# Patient Record
Sex: MALE | Race: WHITE | NOT HISPANIC OR LATINO | Employment: FULL TIME | URBAN - METROPOLITAN AREA
[De-identification: names, ages, dates, MRNs, and addresses within clinical notes are randomized per-mention and may not be internally consistent; named-entity substitution may affect disease eponyms.]

---

## 2017-01-27 ENCOUNTER — OFFICE VISIT (OUTPATIENT)
Dept: GASTROENTEROLOGY | Facility: CLINIC | Age: 32
End: 2017-01-27

## 2017-01-27 VITALS
WEIGHT: 267 LBS | TEMPERATURE: 98.2 F | BODY MASS INDEX: 37.38 KG/M2 | DIASTOLIC BLOOD PRESSURE: 80 MMHG | SYSTOLIC BLOOD PRESSURE: 118 MMHG | OXYGEN SATURATION: 96 % | HEART RATE: 72 BPM | HEIGHT: 71 IN

## 2017-01-27 DIAGNOSIS — R12 HEARTBURN: ICD-10-CM

## 2017-01-27 DIAGNOSIS — K21.9 GASTROESOPHAGEAL REFLUX DISEASE, ESOPHAGITIS PRESENCE NOT SPECIFIED: Primary | ICD-10-CM

## 2017-01-27 DIAGNOSIS — R10.13 DYSPEPSIA: ICD-10-CM

## 2017-01-27 PROCEDURE — 99204 OFFICE O/P NEW MOD 45 MIN: CPT | Performed by: INTERNAL MEDICINE

## 2017-01-27 NOTE — PROGRESS NOTES
Chief Complaint   Patient presents with   • Heartburn       History of Present Illness:   HPI  Mr. Mcclelland is a 32 yo with a history of hypertension and hyperlipidemia.  The patient states that around the end of 2015 he was experiencing problems with burning in the throat and ann marie heartburn.  He was started on Prilosec and he continued the medication until March 2016.  He stopped the medication and was doing well until September of last year when he began experiencing again some burning in the throat.  The patient denies any difficult or painful swallowing.  He has tried some Nexium over-the-counter that he generally would take once in the morning but he would occasionally need to take Zantac at night.  The patient also has used some Gaviscon and other antiacids.  Mr. Mcclelland says there was an issue with the insurance to get other medication for reflux. There is no history of vomiting although occasionally he may have some nausea after meal.  The patient admits to some sense of regurgitation at times.  There is no history of unexplained weight loss.  The patient denies any signs of gastrointestinal bleeding.  He has regular bowel habits without any change in stool caliber.  There is no history of night sweats or fever.  Mr. Mcclelland had a HIDA scan that was abnormal although he states that he was told a vasovagal episode occurred during the study.  He does not drink alcohol on a regular basis.  The patient does not smoke cigarettes.  No history of gastrointestinal cancer in first-degree relatives.  Past Medical History   Diagnosis Date   • Hyperlipidemia    • Hypertension        Past Surgical History   Procedure Laterality Date   • No past surgeries           Current Outpatient Prescriptions:   •  atorvastatin (LIPITOR) 10 MG tablet, TAKE 1 TABLET BY MOUTH AT BEDTIME , Disp: 30 tablet, Rfl: 2  •  Blood Pressure kit, , Disp: , Rfl:   •  esomeprazole (NexIUM) 40 MG packet, Take 40 mg by mouth 2 (Two) Times a  Day. (Patient taking differently: Take 22.5 mg by mouth Daily.), Disp: 60 packet, Rfl: 2  •  lisinopril-hydrochlorothiazide (PRINZIDE,ZESTORETIC) 10-12.5 MG per tablet, TAKE 1 TABLET BY MOUTH ONE TIME A DAY , Disp: 30 tablet, Rfl: 4    No Known Allergies    Family History   Problem Relation Age of Onset   • COPD Mother    • Heart attack Mother 65   • Hypertension Mother    • Hyperlipidemia Mother    • Heart disease Mother    • Hiatal hernia Mother    • Hyperlipidemia Father    • Hypertension Father    • Transient ischemic attack Father    • COPD Father    • Hiatal hernia Sister    • No Known Problems Brother    • No Known Problems Maternal Grandmother    • Cancer Maternal Grandfather      prostate   • Leukemia Paternal Grandmother    • Heart disease Paternal Grandfather    • Hyperlipidemia Paternal Grandfather    • Hypertension Paternal Grandfather    • Heart attack Paternal Grandfather    • No Known Problems Sister        Social History     Social History   • Marital status:      Spouse name: N/A   • Number of children: N/A   • Years of education: N/A     Occupational History   • Not on file.     Social History Main Topics   • Smoking status: Never Smoker   • Smokeless tobacco: Never Used   • Alcohol use 0.6 oz/week     1 Glasses of wine, 1 Cans of beer per week   • Drug use: No   • Sexual activity: Yes     Partners: Female     Other Topics Concern   • Not on file     Social History Narrative    ** Merged History Encounter **            Review of Systems   Constitutional: Negative for activity change, appetite change, fatigue, fever and unexpected weight change.   HENT: Positive for sore throat (burning). Negative for dental problem, hearing loss, mouth sores, postnasal drip, sneezing, trouble swallowing and voice change.    Eyes: Negative for pain, redness, itching and visual disturbance.   Respiratory: Positive for cough. Negative for choking, chest tightness, shortness of breath and wheezing.     Cardiovascular: Negative for chest pain, palpitations and leg swelling.   Gastrointestinal: Positive for abdominal pain (discomfort) and nausea. Negative for abdominal distention, anal bleeding, blood in stool, constipation, diarrhea, rectal pain and vomiting.   Endocrine: Negative for cold intolerance, heat intolerance, polydipsia, polyphagia and polyuria.   Genitourinary: Negative.  Negative for dysuria, enuresis, flank pain, hematuria and urgency.   Musculoskeletal: Negative for arthralgias, back pain, gait problem, joint swelling and myalgias.   Skin: Negative for color change, pallor and rash.   Allergic/Immunologic: Negative for environmental allergies, food allergies and immunocompromised state.   Neurological: Negative for dizziness, tremors, seizures, facial asymmetry, speech difficulty, numbness and headaches.   Hematological: Negative for adenopathy.   Psychiatric/Behavioral: Negative for behavioral problems, confusion, dysphoric mood, hallucinations and self-injury.       Vitals:    01/27/17 1207   BP: 118/80   Pulse: 72   Temp: 98.2 °F (36.8 °C)   SpO2: 96%       Physical Exam   Constitutional: He is oriented to person, place, and time. He appears well-developed and well-nourished. No distress.   HENT:   Head: Normocephalic and atraumatic.   Mouth/Throat: Oropharynx is clear and moist. No oropharyngeal exudate.   Eyes: Conjunctivae and EOM are normal. Pupils are equal, round, and reactive to light. No scleral icterus.   Neck: Normal range of motion. Neck supple. No thyromegaly present.   Cardiovascular: Normal rate, regular rhythm and normal heart sounds.  Exam reveals no gallop.    No murmur heard.  Pulmonary/Chest: Effort normal and breath sounds normal. He has no wheezes. He has no rales.   Abdominal: Soft. Bowel sounds are normal. He exhibits no distension. There is no tenderness. There is no rebound and no guarding.   Musculoskeletal: Normal range of motion. He exhibits no edema or tenderness.    Lymphadenopathy:     He has no cervical adenopathy.   Neurological: He is alert and oriented to person, place, and time. He exhibits normal muscle tone.   Skin: Skin is dry. No erythema. No pallor.   Psychiatric: He has a normal mood and affect. His behavior is normal. Thought content normal.   Vitals reviewed.      Jayden was seen today for heartburn.    Diagnoses and all orders for this visit:    Gastroesophageal reflux disease, esophagitis presence not specified  -     [facility] Surgical/Procedural Request    Heartburn  -     [facility] Surgical/Procedural Request    Dyspepsia  -     [facility] Surgical/Procedural Request    Other orders  -     Verify informed consent; Standing  -     Obtain informed consent; Standing  The patient has a history consistent with GERD.  He does not have any warning signs of dysphagia or weight loss.  He should have an endoscopic evaluation to rule out Munoz's esophagus.  Also need to evaluate for other luminal upper GI tract pathology such as Helicobacter pylori, celiac disease and peptic ulcer disease.  The HIDA scan did show an ejection fraction that was low and he could have some degree of chronic cholecystitis.  The history is not completely  consistent with gallbladder pathology.      Plan: Will proceed with an upper endoscopy for further evaluation.    I spent over 50% of the visit counseling and answering questions from the patient.

## 2017-01-27 NOTE — MR AVS SNAPSHOT
Jayden Mcclelland   1/27/2017 11:30 AM   Office Visit    Dept Phone:  [phone]   Encounter #:  [id number]    Provider:  Rony Flores MD   Department:  [facility] GASTROENTEROLOGY                Your Full Care Plan              Your Updated Medication List          This list is accurate as of: 1/27/17 12:41 PM.  Always use your most recent med list.                atorvastatin 10 MG tablet   Commonly known as:  LIPITOR   TAKE 1 TABLET BY MOUTH AT BEDTIME       Blood Pressure kit       esomeprazole 40 MG packet   Commonly known as:  NexIUM   Take 40 mg by mouth 2 (Two) Times a Day.       lisinopril-hydrochlorothiazide 10-12.5 MG per tablet   Commonly known as:  PRINZIDE,ZESTORETIC   TAKE 1 TABLET BY MOUTH ONE TIME A DAY               We Performed the Following     External [facility] Surgical/Procedural Request       You Were Diagnosed With        Codes Comments    Gastroesophageal reflux disease, esophagitis presence not specified    -  Primary ICD-10-CM: K21.9  ICD-9-CM: 530.81     Heartburn     ICD-10-CM: R12  ICD-9-CM: 787.1     Dyspepsia     ICD-10-CM: R10.13  ICD-9-CM: 536.8       Instructions     None    Patient Instructions History      Upcoming Appointments     Visit Type Date Time Department    NEW PATIENT 1/27/2017 11:30 AM [facility] [place]    NEW PATIENT 2/20/2017 10:00 AM [facility]      [email or web address] Signup     Our records indicate that you have an active [facility] [email or web address] account.    You can view your After Visit Summary by going to Schedulize and logging in with your [email or web address] username and password.  If you don't have a [email or web address] username and password but a parent or guardian has access to your record, the parent or guardian should login with their own [email or web address] username and password and access your record to view the After Visit Summary.    If you have questions, you can email [email or web address] or call  "[phone] to talk to our MyChart staff.  Remember, MyChart is NOT to be used for urgent needs.  For medical emergencies, dial 911.               Other Info from Your Visit           Your Appointments     Feb 20, 2017 10:00 AM EST   New Patient with Ketan Davenport MD   [facility] GENERAL SURGERY (--)    [address] [address] [phone]           Bring all previous medical records and films, along with current medications and insurance information.              Allergies     No Known Allergies      Reason for Visit     Heartburn           Vital Signs     Blood Pressure Pulse Temperature Height    118/80 (BP Location: Right arm, Patient Position: Sitting, Cuff Size: Adult) 72 98.2 °F (36.8 °C) (Temporal Artery ) 71\" (180.3 cm)    Weight Oxygen Saturation Body Mass Index Smoking Status    267 lb (121 kg) 96% 37.24 kg/m2 Never Smoker      Problems and Diagnoses Noted     Acid reflux disease    -  Primary    Heartburn        Dyspepsia            "

## 2017-02-10 ENCOUNTER — OUTSIDE FACILITY SERVICE (OUTPATIENT)
Dept: GASTROENTEROLOGY | Facility: CLINIC | Age: 32
End: 2017-02-10

## 2017-02-10 ENCOUNTER — LAB REQUISITION (OUTPATIENT)
Dept: LAB | Facility: HOSPITAL | Age: 32
End: 2017-02-10

## 2017-02-10 DIAGNOSIS — K21.9 GASTRO-ESOPHAGEAL REFLUX DISEASE WITHOUT ESOPHAGITIS: ICD-10-CM

## 2017-02-10 PROCEDURE — 88305 TISSUE EXAM BY PATHOLOGIST: CPT | Performed by: INTERNAL MEDICINE

## 2017-02-10 PROCEDURE — 43239 EGD BIOPSY SINGLE/MULTIPLE: CPT | Performed by: INTERNAL MEDICINE

## 2017-02-13 LAB
CYTO UR: NORMAL
LAB AP CASE REPORT: NORMAL
LAB AP CLINICAL INFORMATION: NORMAL
Lab: NORMAL
PATH REPORT.FINAL DX SPEC: NORMAL
PATH REPORT.GROSS SPEC: NORMAL

## 2017-02-15 ENCOUNTER — TELEPHONE (OUTPATIENT)
Dept: GASTROENTEROLOGY | Facility: CLINIC | Age: 32
End: 2017-02-15

## 2017-02-15 NOTE — TELEPHONE ENCOUNTER
I called and discussed the results of the pathology. He does have an appointment with a general surgeon.

## 2017-02-20 ENCOUNTER — OFFICE VISIT (OUTPATIENT)
Dept: SURGERY | Facility: CLINIC | Age: 32
End: 2017-02-20

## 2017-02-20 VITALS
OXYGEN SATURATION: 97 % | BODY MASS INDEX: 37.21 KG/M2 | WEIGHT: 265.8 LBS | SYSTOLIC BLOOD PRESSURE: 122 MMHG | RESPIRATION RATE: 16 BRPM | DIASTOLIC BLOOD PRESSURE: 78 MMHG | HEART RATE: 82 BPM | TEMPERATURE: 97.9 F | HEIGHT: 71 IN

## 2017-02-20 DIAGNOSIS — K80.20 CALCULUS OF GALLBLADDER WITHOUT CHOLECYSTITIS WITHOUT OBSTRUCTION: Primary | ICD-10-CM

## 2017-02-20 PROCEDURE — 99202 OFFICE O/P NEW SF 15 MIN: CPT | Performed by: SURGERY

## 2017-02-20 NOTE — PROGRESS NOTES
RADHA Davenport MD  New Clinic Visit/ H&P    Jayden Mcclelland        1985  02/20/2017  Merry Hooker MD    Chief Complaint   Patient presents with   • Abdominal Pain     RUQ     Patient had a Hida scan that was a 15% ejection fraction. He states onsets was 4 months ago with severe heartburn. He denies N/V/D. he had a gallbladder exam done thinking that was causing a lot of his reflux.  The ultrasound showed what appeared to be a polyp or a cluster small stones.  On my review it appears as though there are small stones separately  or along with a composite or a broad-based polyp in the gallbladder.  I think he has definite cholelithiasis..   discussed at length with him the ultrasound and showed him the stones.  I also went through a hiatal hernia and reflux protocol with him and he's been doing most everything he does wrongl.  He is not sure he wants to consider cholecystectomy at this point and will get back with me about that.  Discussed the ramifications and problems with having small gallstones.  Past Medical History   Diagnosis Date   • Hyperlipidemia    • Hypertension      Past Surgical History   Procedure Laterality Date   • No past surgeries       No Known Allergies  Family History   Problem Relation Age of Onset   • COPD Mother    • Heart attack Mother 65   • Hypertension Mother    • Hyperlipidemia Mother    • Heart disease Mother    • Hiatal hernia Mother    • Hyperlipidemia Father    • Hypertension Father    • Transient ischemic attack Father    • COPD Father    • Hiatal hernia Sister    • No Known Problems Brother    • No Known Problems Maternal Grandmother    • Cancer Maternal Grandfather      prostate   • Leukemia Paternal Grandmother    • Heart disease Paternal Grandfather    • Hyperlipidemia Paternal Grandfather    • Hypertension Paternal Grandfather    • Heart attack Paternal Grandfather    • No Known Problems Sister      Social History     Social History   • Marital status:      Spouse  name: N/A   • Number of children: N/A   • Years of education: N/A     Occupational History   • Not on file.     Social History Main Topics   • Smoking status: Never Smoker   • Smokeless tobacco: Never Used   • Alcohol use 0.6 oz/week     1 Glasses of wine, 1 Cans of beer per week   • Drug use: No   • Sexual activity: Yes     Partners: Female     Other Topics Concern   • Not on file     Social History Narrative    ** Merged History Encounter **          Review of Systems   Constitutional: Negative.    HENT: Negative.    Eyes: Negative.    Respiratory: Negative.    Cardiovascular: Negative.    Gastrointestinal: Positive for abdominal pain.        Heartburn   Endocrine: Negative.    Genitourinary: Negative.    Musculoskeletal: Negative.    Skin: Negative.    Allergic/Immunologic: Negative.    Neurological: Negative.    Hematological: Negative.    Psychiatric/Behavioral: Negative.     otherwise ROS not remarkable.    Vitals:    02/20/17 0958   BP: 122/78   Pulse: 82   Resp: 16   Temp: 97.9 °F (36.6 °C)   SpO2: 97%     Physical Exam     Procedures     Assessment/Diagnosis  Cholelithiasis and GERD    Additional:  Plan:    20 minutes was spent face-to-face with patient counseling and discussing procedures     BLANCA Davenport MD

## 2017-02-28 ENCOUNTER — OFFICE VISIT (OUTPATIENT)
Dept: INTERNAL MEDICINE | Facility: CLINIC | Age: 32
End: 2017-02-28

## 2017-02-28 VITALS
WEIGHT: 266.6 LBS | DIASTOLIC BLOOD PRESSURE: 84 MMHG | OXYGEN SATURATION: 98 % | SYSTOLIC BLOOD PRESSURE: 124 MMHG | HEIGHT: 71 IN | HEART RATE: 86 BPM | RESPIRATION RATE: 12 BRPM | BODY MASS INDEX: 37.32 KG/M2

## 2017-02-28 DIAGNOSIS — K82.8 GALLBLADDER SLUDGE: ICD-10-CM

## 2017-02-28 DIAGNOSIS — K82.8 BILIARY DYSKINESIA: ICD-10-CM

## 2017-02-28 DIAGNOSIS — K21.9 GASTROESOPHAGEAL REFLUX DISEASE WITHOUT ESOPHAGITIS: Primary | ICD-10-CM

## 2017-02-28 PROCEDURE — 99213 OFFICE O/P EST LOW 20 MIN: CPT | Performed by: FAMILY MEDICINE

## 2017-02-28 RX ORDER — RANITIDINE 150 MG/1
150 TABLET ORAL 2 TIMES DAILY PRN
COMMUNITY
End: 2017-05-02 | Stop reason: SDUPTHER

## 2017-02-28 RX ORDER — OMEPRAZOLE 40 MG/1
40 CAPSULE, DELAYED RELEASE ORAL DAILY
Qty: 30 CAPSULE | Refills: 2 | Status: SHIPPED | OUTPATIENT
Start: 2017-02-28 | End: 2017-06-04 | Stop reason: SDUPTHER

## 2017-02-28 RX ORDER — LISINOPRIL AND HYDROCHLOROTHIAZIDE 12.5; 1 MG/1; MG/1
TABLET ORAL
Qty: 30 TABLET | Refills: 5 | Status: SHIPPED | OUTPATIENT
Start: 2017-02-28 | End: 2017-09-08 | Stop reason: SDUPTHER

## 2017-05-01 RX ORDER — ATORVASTATIN CALCIUM 10 MG/1
TABLET, FILM COATED ORAL
Qty: 30 TABLET | Refills: 1 | Status: SHIPPED | OUTPATIENT
Start: 2017-05-01 | End: 2017-05-02

## 2017-05-02 ENCOUNTER — OFFICE VISIT (OUTPATIENT)
Dept: INTERNAL MEDICINE | Facility: CLINIC | Age: 32
End: 2017-05-02

## 2017-05-02 VITALS
RESPIRATION RATE: 12 BRPM | SYSTOLIC BLOOD PRESSURE: 122 MMHG | DIASTOLIC BLOOD PRESSURE: 70 MMHG | HEIGHT: 71 IN | OXYGEN SATURATION: 98 % | HEART RATE: 77 BPM | WEIGHT: 266.4 LBS | BODY MASS INDEX: 37.3 KG/M2

## 2017-05-02 DIAGNOSIS — K21.9 GASTROESOPHAGEAL REFLUX DISEASE WITHOUT ESOPHAGITIS: Primary | ICD-10-CM

## 2017-05-02 DIAGNOSIS — F32.1 MODERATE SINGLE CURRENT EPISODE OF MAJOR DEPRESSIVE DISORDER (HCC): ICD-10-CM

## 2017-05-02 PROCEDURE — 99214 OFFICE O/P EST MOD 30 MIN: CPT | Performed by: FAMILY MEDICINE

## 2017-05-02 RX ORDER — VENLAFAXINE HYDROCHLORIDE 75 MG/1
75 CAPSULE, EXTENDED RELEASE ORAL DAILY
Qty: 30 CAPSULE | Refills: 1 | Status: SHIPPED | OUTPATIENT
Start: 2017-05-02 | End: 2017-07-10 | Stop reason: SDUPTHER

## 2017-05-02 RX ORDER — SUCRALFATE 1 G/1
1 TABLET ORAL
Qty: 56 TABLET | Refills: 0 | Status: SHIPPED | OUTPATIENT
Start: 2017-05-02 | End: 2017-05-20 | Stop reason: SDUPTHER

## 2017-05-02 RX ORDER — RANITIDINE 150 MG/1
150 TABLET ORAL 2 TIMES DAILY
Qty: 60 TABLET | Refills: 2 | Status: SHIPPED | OUTPATIENT
Start: 2017-05-02 | End: 2018-05-07

## 2017-05-02 RX ORDER — VENLAFAXINE HYDROCHLORIDE 37.5 MG/1
37.5 CAPSULE, EXTENDED RELEASE ORAL DAILY
Qty: 10 CAPSULE | Refills: 0 | Status: SHIPPED | OUTPATIENT
Start: 2017-05-02 | End: 2017-05-12

## 2017-05-20 DIAGNOSIS — K21.9 GASTROESOPHAGEAL REFLUX DISEASE WITHOUT ESOPHAGITIS: ICD-10-CM

## 2017-05-22 RX ORDER — SUCRALFATE 1 G/1
TABLET ORAL
Qty: 56 TABLET | Refills: 0 | Status: SHIPPED | OUTPATIENT
Start: 2017-05-22 | End: 2018-04-26 | Stop reason: HOSPADM

## 2017-05-23 DIAGNOSIS — K21.9 GASTROESOPHAGEAL REFLUX DISEASE WITHOUT ESOPHAGITIS: ICD-10-CM

## 2017-05-23 RX ORDER — SUCRALFATE 1 G/1
TABLET ORAL
Qty: 56 TABLET | Refills: 0 | Status: SHIPPED | OUTPATIENT
Start: 2017-05-23 | End: 2017-05-30 | Stop reason: SDUPTHER

## 2017-05-30 ENCOUNTER — OFFICE VISIT (OUTPATIENT)
Dept: INTERNAL MEDICINE | Facility: CLINIC | Age: 32
End: 2017-05-30

## 2017-05-30 VITALS
TEMPERATURE: 97.5 F | OXYGEN SATURATION: 98 % | HEIGHT: 71 IN | WEIGHT: 257.4 LBS | SYSTOLIC BLOOD PRESSURE: 124 MMHG | BODY MASS INDEX: 36.03 KG/M2 | RESPIRATION RATE: 12 BRPM | DIASTOLIC BLOOD PRESSURE: 80 MMHG | HEART RATE: 82 BPM

## 2017-05-30 DIAGNOSIS — K21.9 GASTROESOPHAGEAL REFLUX DISEASE WITHOUT ESOPHAGITIS: ICD-10-CM

## 2017-05-30 DIAGNOSIS — J32.9 VIRAL SINUSITIS: ICD-10-CM

## 2017-05-30 DIAGNOSIS — B97.89 VIRAL SINUSITIS: ICD-10-CM

## 2017-05-30 DIAGNOSIS — F32.1 MODERATE SINGLE CURRENT EPISODE OF MAJOR DEPRESSIVE DISORDER (HCC): Primary | ICD-10-CM

## 2017-05-30 PROCEDURE — 99214 OFFICE O/P EST MOD 30 MIN: CPT | Performed by: FAMILY MEDICINE

## 2017-05-30 RX ORDER — SUCRALFATE 1 G/1
1 TABLET ORAL 4 TIMES DAILY
Qty: 120 TABLET | Refills: 0 | Status: SHIPPED | OUTPATIENT
Start: 2017-05-30 | End: 2017-06-29

## 2017-05-30 RX ORDER — ATORVASTATIN CALCIUM 10 MG/1
TABLET, FILM COATED ORAL
Refills: 1 | COMMUNITY
Start: 2017-05-01 | End: 2017-05-30

## 2017-06-04 DIAGNOSIS — K21.9 GASTROESOPHAGEAL REFLUX DISEASE WITHOUT ESOPHAGITIS: ICD-10-CM

## 2017-06-05 RX ORDER — OMEPRAZOLE 40 MG/1
CAPSULE, DELAYED RELEASE ORAL
Qty: 30 CAPSULE | Refills: 1 | Status: SHIPPED | OUTPATIENT
Start: 2017-06-05 | End: 2017-08-03 | Stop reason: SDUPTHER

## 2017-07-09 ENCOUNTER — PATIENT MESSAGE (OUTPATIENT)
Dept: INTERNAL MEDICINE | Facility: CLINIC | Age: 32
End: 2017-07-09

## 2017-07-09 DIAGNOSIS — F32.1 MODERATE SINGLE CURRENT EPISODE OF MAJOR DEPRESSIVE DISORDER (HCC): ICD-10-CM

## 2017-07-10 RX ORDER — VENLAFAXINE HYDROCHLORIDE 75 MG/1
75 CAPSULE, EXTENDED RELEASE ORAL DAILY
Qty: 30 CAPSULE | Refills: 3 | Status: SHIPPED | OUTPATIENT
Start: 2017-07-10 | End: 2018-04-26 | Stop reason: HOSPADM

## 2017-07-10 NOTE — TELEPHONE ENCOUNTER
From: Jayden Mcclelland  To: Merry Hooker MD  Sent: 7/9/2017 3:44 PM EDT  Subject: Prescription Question    Need refill on effexor, nearing end of supply and 0 refills on bottle.

## 2017-07-31 ENCOUNTER — OFFICE VISIT (OUTPATIENT)
Dept: INTERNAL MEDICINE | Facility: CLINIC | Age: 32
End: 2017-07-31

## 2017-07-31 VITALS
WEIGHT: 253.8 LBS | RESPIRATION RATE: 12 BRPM | DIASTOLIC BLOOD PRESSURE: 80 MMHG | HEART RATE: 112 BPM | OXYGEN SATURATION: 98 % | SYSTOLIC BLOOD PRESSURE: 124 MMHG | HEIGHT: 71 IN | BODY MASS INDEX: 35.53 KG/M2

## 2017-07-31 DIAGNOSIS — F32.1 MODERATE SINGLE CURRENT EPISODE OF MAJOR DEPRESSIVE DISORDER (HCC): Primary | ICD-10-CM

## 2017-07-31 DIAGNOSIS — G47.09 OTHER INSOMNIA: ICD-10-CM

## 2017-07-31 PROCEDURE — 99213 OFFICE O/P EST LOW 20 MIN: CPT | Performed by: FAMILY MEDICINE

## 2017-07-31 RX ORDER — TRAZODONE HYDROCHLORIDE 50 MG/1
50 TABLET ORAL NIGHTLY PRN
Qty: 30 TABLET | Refills: 2 | Status: SHIPPED | OUTPATIENT
Start: 2017-07-31 | End: 2018-04-26 | Stop reason: HOSPADM

## 2017-08-01 LAB
T4 SERPL-MCNC: 6.6 UG/DL (ref 4.5–12)
TSH SERPL DL<=0.005 MIU/L-ACNC: 1.09 MIU/ML (ref 0.47–4.68)

## 2017-08-03 ENCOUNTER — TELEPHONE (OUTPATIENT)
Dept: INTERNAL MEDICINE | Facility: CLINIC | Age: 32
End: 2017-08-03

## 2017-08-03 DIAGNOSIS — K21.9 GASTROESOPHAGEAL REFLUX DISEASE WITHOUT ESOPHAGITIS: ICD-10-CM

## 2017-08-03 RX ORDER — OMEPRAZOLE 40 MG/1
40 CAPSULE, DELAYED RELEASE ORAL DAILY
Qty: 30 CAPSULE | Refills: 5 | Status: SHIPPED | OUTPATIENT
Start: 2017-08-03 | End: 2018-02-12 | Stop reason: SDUPTHER

## 2017-08-03 NOTE — TELEPHONE ENCOUNTER
----- Message from Jayden Mcclelland sent at 8/2/2017 11:44 AM EDT -----  Regarding: Prescription Question  Contact: [phone]  Need refill sent in for omeprazole

## 2017-09-08 RX ORDER — LISINOPRIL AND HYDROCHLOROTHIAZIDE 12.5; 1 MG/1; MG/1
1 TABLET ORAL DAILY
Qty: 30 TABLET | Refills: 5 | Status: SHIPPED | OUTPATIENT
Start: 2017-09-08 | End: 2018-03-29 | Stop reason: SDUPTHER

## 2018-02-10 ENCOUNTER — PATIENT MESSAGE (OUTPATIENT)
Dept: INTERNAL MEDICINE | Facility: CLINIC | Age: 33
End: 2018-02-10

## 2018-02-10 DIAGNOSIS — K21.9 GASTROESOPHAGEAL REFLUX DISEASE WITHOUT ESOPHAGITIS: ICD-10-CM

## 2018-02-12 RX ORDER — OMEPRAZOLE 40 MG/1
40 CAPSULE, DELAYED RELEASE ORAL DAILY
Qty: 30 CAPSULE | Refills: 5 | Status: SHIPPED | OUTPATIENT
Start: 2018-02-12 | End: 2018-05-07

## 2018-02-12 NOTE — TELEPHONE ENCOUNTER
From: Jayden Mcclelland  To: Merry Hooker MD  Sent: 2/10/2018 9:38 AM EST  Subject: Prescription Question    Need refill on omeprazole sent to [facility]

## 2018-03-29 RX ORDER — LISINOPRIL AND HYDROCHLOROTHIAZIDE 12.5; 1 MG/1; MG/1
TABLET ORAL
Qty: 30 TABLET | Refills: 5 | Status: SHIPPED | OUTPATIENT
Start: 2018-03-29 | End: 2018-07-24 | Stop reason: SDUPTHER

## 2018-04-26 ENCOUNTER — OFFICE VISIT (OUTPATIENT)
Dept: GASTROENTEROLOGY | Facility: CLINIC | Age: 33
End: 2018-04-26

## 2018-04-26 VITALS
HEIGHT: 71 IN | SYSTOLIC BLOOD PRESSURE: 110 MMHG | BODY MASS INDEX: 38.67 KG/M2 | OXYGEN SATURATION: 97 % | WEIGHT: 276.2 LBS | HEART RATE: 99 BPM | DIASTOLIC BLOOD PRESSURE: 80 MMHG | TEMPERATURE: 97.6 F

## 2018-04-26 DIAGNOSIS — K21.00 GASTROESOPHAGEAL REFLUX DISEASE WITH ESOPHAGITIS: Primary | ICD-10-CM

## 2018-04-26 PROCEDURE — 99213 OFFICE O/P EST LOW 20 MIN: CPT | Performed by: INTERNAL MEDICINE

## 2018-04-26 NOTE — PROGRESS NOTES
PCP: Merry Hooker MD    Chief Complaint   Patient presents with   • Follow-up       History of Present Illness:   HPI  Mr. Mcclelland is a 33-year-old who returns for a follow-up visit.  The patient states that the issue with acid reflux has truly not improved.  He take omeprazole daily and Zantac as needed.  He experiences a burning in the throat area and upper chest.  The patient denies any difficult or painful swallowing.  He has gained over 20 pounds since his last office visit.  Mr. Mcclelland denies any current issues with abdominal pain or nausea.  The patient denies any bloating.  There is no history of night sweats, fever or chills.  Mr. Mcclelland denies any signs of gastrointestinal bleeding.  Past Medical History:   Diagnosis Date   • GERD (gastroesophageal reflux disease)    • Hyperlipidemia    • Hypertension        Past Surgical History:   Procedure Laterality Date   • ENDOSCOPY  02/10/2017   • NO PAST SURGERIES           Current Outpatient Prescriptions:   •  lisinopril-hydrochlorothiazide (PRINZIDE,ZESTORETIC) 10-12.5 MG per tablet, TAKE 1 TABLET BY MOUTH ONE TIME A DAY , Disp: 30 tablet, Rfl: 5  •  omeprazole (priLOSEC) 40 MG capsule, Take 1 capsule by mouth Daily., Disp: 30 capsule, Rfl: 5  •  raNITIdine (ZANTAC) 150 MG tablet, Take 1 tablet by mouth 2 (Two) Times a Day. (Patient taking differently: Take 150 mg by mouth As Needed.), Disp: 60 tablet, Rfl: 2    No Known Allergies    Family History   Problem Relation Age of Onset   • COPD Mother    • Heart attack Mother 65   • Hypertension Mother    • Hyperlipidemia Mother    • Heart disease Mother    • Hiatal hernia Mother    • Hernia Mother      Hiatal hernia   • Hyperlipidemia Father    • Hypertension Father    • Transient ischemic attack Father    • COPD Father    • No Known Problems Brother    • No Known Problems Maternal Grandmother    • Cancer Maternal Grandfather      prostate   • Leukemia Paternal Grandmother    • Heart disease Paternal  Grandfather    • Hyperlipidemia Paternal Grandfather    • Hypertension Paternal Grandfather    • Heart attack Paternal Grandfather    • No Known Problems Sister        Social History     Social History   • Marital status:      Spouse name: N/A   • Number of children: N/A   • Years of education: N/A     Occupational History   • Not on file.     Social History Main Topics   • Smoking status: Never Smoker   • Smokeless tobacco: Never Used   • Alcohol use 0.6 oz/week     1 Glasses of wine, 1 Cans of beer per week      Comment: per month   • Drug use: No   • Sexual activity: Yes     Partners: Female     Other Topics Concern   • Not on file     Social History Narrative    ** Merged History Encounter **            Review of Systems   Constitutional: Negative for activity change, appetite change, fatigue, fever and unexpected weight change.   HENT: Positive for sore throat. Negative for dental problem, hearing loss, mouth sores, postnasal drip, sneezing, trouble swallowing and voice change.    Eyes: Negative for pain, redness, itching and visual disturbance.   Respiratory: Negative for cough, choking, chest tightness, shortness of breath and wheezing.    Cardiovascular: Negative for chest pain, palpitations and leg swelling.   Gastrointestinal: Negative for abdominal distention, abdominal pain, anal bleeding, blood in stool, constipation, diarrhea, nausea, rectal pain and vomiting.        Heartburn/ reflux   Endocrine: Negative for cold intolerance, heat intolerance, polydipsia, polyphagia and polyuria.   Genitourinary: Negative.  Negative for dysuria, enuresis, flank pain, hematuria and urgency.   Musculoskeletal: Negative for arthralgias, back pain, gait problem, joint swelling and myalgias.   Skin: Negative for color change, pallor and rash.   Allergic/Immunologic: Negative for environmental allergies, food allergies and immunocompromised state.   Neurological: Negative for dizziness, tremors, seizures, facial  asymmetry, speech difficulty, numbness and headaches.   Hematological: Negative for adenopathy.   Psychiatric/Behavioral: Negative for behavioral problems, confusion, dysphoric mood, hallucinations and self-injury.       Vitals:    04/26/18 0954   BP: 110/80   Pulse: 99   Temp: 97.6 °F (36.4 °C)   SpO2: 97%       Physical Exam   Constitutional: He is oriented to person, place, and time. He appears well-nourished. No distress.   HENT:   Head: Normocephalic and atraumatic.   Mouth/Throat: Oropharynx is clear and moist. No oropharyngeal exudate.   Eyes: EOM are normal. No scleral icterus.   Neck: Neck supple. No thyromegaly present.   Cardiovascular: Normal rate, regular rhythm and normal heart sounds.  Exam reveals no gallop.    No murmur heard.  Pulmonary/Chest: Effort normal and breath sounds normal. He has no wheezes. He has no rales.   Abdominal: Soft. Bowel sounds are normal. There is no tenderness. There is no rebound and no guarding.   Musculoskeletal: Normal range of motion. He exhibits no edema or tenderness.   Lymphadenopathy:     He has no cervical adenopathy.   Neurological: He is alert and oriented to person, place, and time. He exhibits normal muscle tone.   Skin: Skin is dry. No erythema. No pallor.   Psychiatric: He has a normal mood and affect. His behavior is normal. Thought content normal.   Vitals reviewed.      Jayden was seen today for follow-up.    Diagnoses and all orders for this visit:    Gastroesophageal reflux disease with esophagitis          Plan: Will give the patient a trial of Dexilant 60 mg to take once daily.           Encourage the patient with regards to gradual weight loss.  Will also give the patient a handout on some lifestyle modifications.           If the Dexilant does give him relief will prescribe for the patient to take on a daily basis.      I spent over 50% of the office visit counseling and answering questions from the patient.

## 2018-04-26 NOTE — PATIENT INSTRUCTIONS
Gastroesophageal Reflux Disease, Adult  Normally, food travels down the esophagus and stays in the stomach to be digested. If a person has gastroesophageal reflux disease (GERD), food and stomach acid move back up into the esophagus. When this happens, the esophagus becomes sore and swollen (inflamed). Over time, GERD can make small holes (ulcers) in the lining of the esophagus.  Follow these instructions at home:  Diet   · Follow a diet as told by your doctor. You may need to avoid foods and drinks such as:  ¨ Coffee and tea (with or without caffeine).  ¨ Drinks that contain alcohol.  ¨ Energy drinks and sports drinks.  ¨ Carbonated drinks or sodas.  ¨ Chocolate and cocoa.  ¨ Peppermint and mint flavorings.  ¨ Garlic and onions.  ¨ Horseradish.  ¨ Spicy and acidic foods, such as peppers, chili powder, lugo powder, vinegar, hot sauces, and BBQ sauce.  ¨ Citrus fruit juices and citrus fruits, such as oranges, jerzy, and limes.  ¨ Tomato-based foods, such as red sauce, chili, salsa, and pizza with red sauce.  ¨ Fried and fatty foods, such as donuts, french fries, potato chips, and high-fat dressings.  ¨ High-fat meats, such as hot dogs, rib eye steak, sausage, ham, and stringer.  ¨ High-fat dairy items, such as whole milk, butter, and cream cheese.  · Eat small meals often. Avoid eating large meals.  · Avoid drinking large amounts of liquid with your meals.  · Avoid eating meals during the 2-3 hours before bedtime.  · Avoid lying down right after you eat.  · Do not exercise right after you eat.  General instructions   · Pay attention to any changes in your symptoms.  · Take over-the-counter and prescription medicines only as told by your doctor. Do not take aspirin, ibuprofen, or other NSAIDs unless your doctor says it is okay.  · Do not use any tobacco products, including cigarettes, chewing tobacco, and e-cigarettes. If you need help quitting, ask your doctor.  · Wear loose clothes. Do not wear anything tight around  your waist.  · Raise (elevate) the head of your bed about 6 inches (15 cm).  · Try to lower your stress. If you need help doing this, ask your doctor.  · If you are overweight, lose an amount of weight that is healthy for you. Ask your doctor about a safe weight loss goal.  · Keep all follow-up visits as told by your doctor. This is important.  Contact a doctor if:  · You have new symptoms.  · You lose weight and you do not know why it is happening.  · You have trouble swallowing, or it hurts to swallow.  · You have wheezing or a cough that keeps happening.  · Your symptoms do not get better with treatment.  · You have a hoarse voice.  Get help right away if:  · You have pain in your arms, neck, jaw, teeth, or back.  · You feel sweaty, dizzy, or light-headed.  · You have chest pain or shortness of breath.  · You throw up (vomit) and your throw up looks like blood or coffee grounds.  · You pass out (faint).  · Your poop (stool) is bloody or black.  · You cannot swallow, drink, or eat.  This information is not intended to replace advice given to you by your health care provider. Make sure you discuss any questions you have with your health care provider.  Document Released: 06/05/2009 Document Revised: 05/25/2017 Document Reviewed: 04/13/2016  [employer] Patient Education © 2017 [employer]

## 2018-05-07 ENCOUNTER — TELEPHONE (OUTPATIENT)
Dept: GASTROENTEROLOGY | Facility: CLINIC | Age: 33
End: 2018-05-07

## 2018-05-07 DIAGNOSIS — K21.00 GASTROESOPHAGEAL REFLUX DISEASE WITH ESOPHAGITIS: Primary | ICD-10-CM

## 2018-05-07 RX ORDER — DEXLANSOPRAZOLE 60 MG/1
60 CAPSULE, DELAYED RELEASE ORAL DAILY
Qty: 30 CAPSULE | Refills: 2 | Status: SHIPPED | OUTPATIENT
Start: 2018-05-07 | End: 2019-05-10 | Stop reason: SDUPTHER

## 2018-05-07 NOTE — TELEPHONE ENCOUNTER
----- Message from Jayden Mcclelland sent at 5/7/2018  9:37 AM EDT -----  Regarding: Prescription Question  Contact: [phone]  Follow up for Dr. Flores, he gave me some samples of Dexilant 60mg and asked me to reach out if it seemed to be working better than the Omeprazole 40mg I had been taking previously so he could send in a prescription for the Dexilant to my pharmacy. The Dexilant does seem to be managing my reflux symptoms with some success, so I'd like him to go ahead and prescribe it for me and send it to my pharmacy.    Thanks

## 2018-07-24 ENCOUNTER — OFFICE VISIT (OUTPATIENT)
Dept: INTERNAL MEDICINE | Facility: CLINIC | Age: 33
End: 2018-07-24

## 2018-07-24 VITALS
BODY MASS INDEX: 38.08 KG/M2 | HEART RATE: 96 BPM | SYSTOLIC BLOOD PRESSURE: 122 MMHG | WEIGHT: 272 LBS | DIASTOLIC BLOOD PRESSURE: 80 MMHG | OXYGEN SATURATION: 100 % | TEMPERATURE: 97.7 F | HEIGHT: 71 IN

## 2018-07-24 DIAGNOSIS — K21.9 GASTROESOPHAGEAL REFLUX DISEASE WITHOUT ESOPHAGITIS: Primary | ICD-10-CM

## 2018-07-24 DIAGNOSIS — I10 BENIGN ESSENTIAL HYPERTENSION: ICD-10-CM

## 2018-07-24 DIAGNOSIS — Z23 NEED FOR DIPHTHERIA-TETANUS-PERTUSSIS (TDAP) VACCINE: ICD-10-CM

## 2018-07-24 DIAGNOSIS — E78.49 OTHER HYPERLIPIDEMIA: ICD-10-CM

## 2018-07-24 LAB
ALBUMIN SERPL-MCNC: 4.6 G/DL (ref 3.5–5)
ALBUMIN/GLOB SERPL: 1.4 G/DL (ref 1–2)
ALP SERPL-CCNC: 120 U/L (ref 38–126)
ALT SERPL-CCNC: 28 U/L (ref 13–69)
AST SERPL-CCNC: 23 U/L (ref 15–46)
BILIRUB SERPL-MCNC: 0.4 MG/DL (ref 0.2–1.3)
BUN SERPL-MCNC: 20 MG/DL (ref 7–20)
BUN/CREAT SERPL: 15.4 (ref 6.3–21.9)
CALCIUM SERPL-MCNC: 10 MG/DL (ref 8.4–10.2)
CHLORIDE SERPL-SCNC: 103 MMOL/L (ref 98–107)
CHOLEST SERPL-MCNC: 250 MG/DL (ref 0–199)
CO2 SERPL-SCNC: 28 MMOL/L (ref 26–30)
CREAT SERPL-MCNC: 1.3 MG/DL (ref 0.6–1.3)
GLOBULIN SER CALC-MCNC: 3.2 GM/DL
GLUCOSE SERPL-MCNC: 104 MG/DL (ref 74–98)
HDLC SERPL-MCNC: 33 MG/DL (ref 40–60)
LDLC SERPL CALC-MCNC: 183 MG/DL (ref 0–99)
POTASSIUM SERPL-SCNC: 4.2 MMOL/L (ref 3.5–5.1)
PROT SERPL-MCNC: 7.8 G/DL (ref 6.3–8.2)
SODIUM SERPL-SCNC: 141 MMOL/L (ref 137–145)
TRIGL SERPL-MCNC: 170 MG/DL
VLDLC SERPL CALC-MCNC: 34 MG/DL

## 2018-07-24 PROCEDURE — 90471 IMMUNIZATION ADMIN: CPT | Performed by: FAMILY MEDICINE

## 2018-07-24 PROCEDURE — 99214 OFFICE O/P EST MOD 30 MIN: CPT | Performed by: FAMILY MEDICINE

## 2018-07-24 PROCEDURE — 90715 TDAP VACCINE 7 YRS/> IM: CPT | Performed by: FAMILY MEDICINE

## 2018-07-24 RX ORDER — LISINOPRIL AND HYDROCHLOROTHIAZIDE 12.5; 1 MG/1; MG/1
1 TABLET ORAL DAILY
Qty: 90 TABLET | Refills: 3 | Status: SHIPPED | OUTPATIENT
Start: 2018-07-24

## 2018-07-24 RX ORDER — RANITIDINE 150 MG/1
150 CAPSULE ORAL EVERY EVENING
Qty: 30 CAPSULE | Refills: 11 | Status: SHIPPED | OUTPATIENT
Start: 2018-07-24 | End: 2018-10-08

## 2018-07-24 RX ORDER — DEXLANSOPRAZOLE 60 MG/1
CAPSULE, DELAYED RELEASE ORAL
Refills: 2 | COMMUNITY
Start: 2018-07-07 | End: 2018-07-24 | Stop reason: SDUPTHER

## 2018-07-24 RX ORDER — DEXLANSOPRAZOLE 60 MG/1
60 CAPSULE, DELAYED RELEASE ORAL DAILY
Qty: 30 CAPSULE | Refills: 5 | Status: SHIPPED | OUTPATIENT
Start: 2018-07-24 | End: 2018-10-08 | Stop reason: SDUPTHER

## 2018-07-24 NOTE — PATIENT INSTRUCTIONS
2 different protocols:     30 minutes:    Run as fast as you can for 10 seconds   Walk as fast as you can for 20 seconds   Walk as slow as you can for 30 seconds    30 minutes on stationary bike (muscle energy booster)   Ride as hard and as fast as you can for 1 minute   Rest for 4 minutes      I don't have a phone number yet, but I've leased office space at [address] [place] [address] [address], here in [place].  Its in the little group of offices behind the [employer], [employer], and [employer].  I hope to be open by mid-September.  An advertisement with more information will be on the front page of the [facility]  at the end of August.

## 2018-07-24 NOTE — PROGRESS NOTES
SUBJECTIVE: Jayden Mcclelland is a 33 y.o. male seen for a follow up visit;    GERD  Jorgetent complains of heartburn. This has been associated with cough, heartburn, midespigastric pain, nocturnal burning, symptoms primarily relate to meals, and lying down after meals and waterbrash.  He denies early satiety and unexpected weight loss. Symptoms have been present for several months. He denies dysphagia. He has not lost weight. He denies melena, hematochezia, hematemesis, and coffee ground emesis. Medical therapy in the past has included proton pump inhibitors.  He is getting symptoms 3-4 times a week and taking ranitidine as needed.  Has done some hiatal hernia lifestyle changes.             The following portions of the patient's history were reviewed and updated as appropriate: He  has a past medical history of GERD (gastroesophageal reflux disease); Hyperlipidemia; and Hypertension.  He has Benign essential hypertension; Hyperlipidemia; Gastroesophageal reflux disease without esophagitis; and Moderate single current episode of major depressive disorder (CMS/HCC) on his pertinent problem list.  He  has a past surgical history that includes No past surgeries and Esophagogastroduodenoscopy (02/10/2017).  His family history includes COPD in his father and mother; Cancer in his maternal grandfather; Heart attack in his paternal grandfather; Heart attack (age of onset: 65) in his mother; Heart disease in his mother and paternal grandfather; Hernia in his mother; Hiatal hernia in his mother; Hyperlipidemia in his father, mother, and paternal grandfather; Hypertension in his father, mother, and paternal grandfather; Leukemia in his paternal grandmother; No Known Problems in his brother, maternal grandmother, and sister; Transient ischemic attack in his father.  He  reports that he has never smoked. He has never used smokeless tobacco. He reports that he drinks about 0.6 oz of alcohol per week . He reports that he does not  "use drugs.  He has a current medication list which includes the following prescription(s): dexilant, lisinopril-hydrochlorothiazide, and ranitidine..    Review of Systems   Constitutional: Negative.    Respiratory: Negative.    Cardiovascular: Negative.    Psychiatric/Behavioral: Negative.          OBJECTIVE:  /80   Pulse 96   Temp 97.7 °F (36.5 °C)   Ht 180.3 cm (71\")   Wt 123 kg (272 lb)   SpO2 100%   BMI 37.94 kg/m²      Physical Exam   Constitutional: He appears well-developed and well-nourished. No distress.           ASSESSMENT:   Diagnosis Plan   1. Gastroesophageal reflux disease without esophagitis  DEXILANT 60 MG capsule    ranitidine (ZANTAC) 150 MG capsule   2. Benign essential hypertension  lisinopril-hydrochlorothiazide (PRINZIDE,ZESTORETIC) 10-12.5 MG per tablet    Lipid Panel    Comprehensive Metabolic Panel   3. Other hyperlipidemia  Lipid Panel    Comprehensive Metabolic Panel   4. Need for diphtheria-tetanus-pertussis (Tdap) vaccine  Tdap Vaccine Greater Than or Equal To 6yo IM    Tdap Vaccine Greater Than or Equal To 6yo IM       Advised pt to contact GI if addition of zantac does not resolve his symptoms.                   "

## 2018-10-08 ENCOUNTER — TELEPHONE (OUTPATIENT)
Dept: GASTROENTEROLOGY | Facility: CLINIC | Age: 33
End: 2018-10-08

## 2018-10-08 DIAGNOSIS — K21.9 GASTROESOPHAGEAL REFLUX DISEASE WITHOUT ESOPHAGITIS: ICD-10-CM

## 2018-10-08 RX ORDER — DEXLANSOPRAZOLE 60 MG/1
60 CAPSULE, DELAYED RELEASE ORAL DAILY
Qty: 30 CAPSULE | Refills: 5 | Status: SHIPPED | OUTPATIENT
Start: 2018-10-08 | End: 2019-05-10 | Stop reason: SDUPTHER

## 2019-04-03 ENCOUNTER — OFFICE VISIT (OUTPATIENT)
Dept: GASTROENTEROLOGY | Facility: CLINIC | Age: 34
End: 2019-04-03

## 2019-04-03 VITALS
HEIGHT: 71 IN | SYSTOLIC BLOOD PRESSURE: 144 MMHG | WEIGHT: 273.2 LBS | HEART RATE: 109 BPM | TEMPERATURE: 97.8 F | DIASTOLIC BLOOD PRESSURE: 82 MMHG | BODY MASS INDEX: 38.25 KG/M2 | OXYGEN SATURATION: 98 %

## 2019-04-03 DIAGNOSIS — K21.00 GASTROESOPHAGEAL REFLUX DISEASE WITH ESOPHAGITIS: Primary | ICD-10-CM

## 2019-04-03 PROCEDURE — 99214 OFFICE O/P EST MOD 30 MIN: CPT | Performed by: INTERNAL MEDICINE

## 2019-04-03 NOTE — PROGRESS NOTES
PCP: Merry Hooker MD    Chief Complaint   Patient presents with   • Follow-up       History of Present Illness:   HPI  Mr. Mcclelland returns to the office for follow-up.  He does experience some improvement in reflux symptoms with Dexilant once daily.  However there are issues with heartburn several days a week on the medication.  Mr. Mcclelland states he has elevated the head of his bed and tried dietary changes.  Patient has not been able to lose any significant weight.  He also has some nausea at times.  The patient denies any ann marie bloating.  Mr. Mcclelland denies any change in bowel habits or signs of gastrointestinal bleeding.  Past Medical History:   Diagnosis Date   • GERD (gastroesophageal reflux disease)    • Hyperlipidemia    • Hypertension        Past Surgical History:   Procedure Laterality Date   • ENDOSCOPY  02/10/2017   • NO PAST SURGERIES           Current Outpatient Medications:   •  DEXILANT 60 MG capsule, Take 1 capsule by mouth Daily., Disp: 30 capsule, Rfl: 5  •  lisinopril-hydrochlorothiazide (PRINZIDE,ZESTORETIC) 10-12.5 MG per tablet, Take 1 tablet by mouth Daily., Disp: 90 tablet, Rfl: 3    No Known Allergies    Family History   Problem Relation Age of Onset   • COPD Mother    • Heart attack Mother 65   • Hypertension Mother    • Hyperlipidemia Mother    • Heart disease Mother    • Hiatal hernia Mother    • Hernia Mother         Hiatal hernia   • Hyperlipidemia Father    • Hypertension Father    • Transient ischemic attack Father    • COPD Father    • No Known Problems Brother    • No Known Problems Maternal Grandmother    • Cancer Maternal Grandfather         prostate   • Leukemia Paternal Grandmother    • Heart disease Paternal Grandfather    • Hyperlipidemia Paternal Grandfather    • Hypertension Paternal Grandfather    • Heart attack Paternal Grandfather    • No Known Problems Sister        Social History     Socioeconomic History   • Marital status:      Spouse name: Not on  file   • Number of children: Not on file   • Years of education: Not on file   • Highest education level: Not on file   Tobacco Use   • Smoking status: Never Smoker   • Smokeless tobacco: Never Used   Substance and Sexual Activity   • Alcohol use: Yes     Alcohol/week: 0.6 oz     Types: 1 Glasses of wine, 1 Cans of beer per week     Comment: per month   • Drug use: No   • Sexual activity: Yes     Partners: Female   Social History Narrative    ** Merged History Encounter **            Review of Systems   Constitutional: Negative for activity change, appetite change, fatigue, fever and unexpected weight change.   HENT: Negative for dental problem, hearing loss, mouth sores, postnasal drip, sneezing, trouble swallowing and voice change.    Eyes: Negative for pain, redness, itching and visual disturbance.   Respiratory: Negative for cough, choking, chest tightness, shortness of breath and wheezing.    Cardiovascular: Negative for chest pain, palpitations and leg swelling.   Gastrointestinal: Negative for abdominal distention, abdominal pain, anal bleeding, blood in stool, constipation, diarrhea, nausea, rectal pain and vomiting.        Heartburn/reflux   Endocrine: Negative for cold intolerance, heat intolerance, polydipsia, polyphagia and polyuria.   Genitourinary: Negative.  Negative for dysuria, enuresis, flank pain, hematuria and urgency.   Musculoskeletal: Negative for arthralgias, back pain, gait problem, joint swelling and myalgias.   Skin: Negative for color change, pallor and rash.   Allergic/Immunologic: Negative for environmental allergies, food allergies and immunocompromised state.   Neurological: Negative for dizziness, tremors, seizures, facial asymmetry, speech difficulty, numbness and headaches.   Hematological: Negative for adenopathy.   Psychiatric/Behavioral: Negative for behavioral problems, confusion, dysphoric mood, hallucinations and self-injury.       Vitals:    04/03/19 1355   BP: 144/82    Pulse: 109   Temp: 97.8 °F (36.6 °C)   SpO2: 98%       Physical Exam   Constitutional: He is oriented to person, place, and time. He appears well-nourished. No distress.   HENT:   Head: Atraumatic.   Mouth/Throat: Oropharynx is clear and moist. No oropharyngeal exudate.   Eyes: Conjunctivae and EOM are normal. No scleral icterus.   Neck: Neck supple. No thyromegaly present.   Cardiovascular: Normal rate, regular rhythm and normal heart sounds. Exam reveals no gallop.   No murmur heard.  Pulmonary/Chest: Effort normal and breath sounds normal. He has no wheezes. He has no rales.   Abdominal: Soft. Bowel sounds are normal. There is no tenderness. There is no rebound and no guarding.   Musculoskeletal: Normal range of motion. He exhibits no edema or tenderness.   Lymphadenopathy:     He has no cervical adenopathy.   Neurological: He is alert and oriented to person, place, and time. He exhibits normal muscle tone.   Skin: Skin is dry. No erythema.   Psychiatric: He has a normal mood and affect. His behavior is normal. Thought content normal.   Vitals reviewed.      Jayden was seen today for follow-up.    Diagnoses and all orders for this visit:    Gastroesophageal reflux disease with esophagitis  -     NM Gastric Emptying; Future  -     Ambulatory Referral to General Surgery    The patient had evidence of reflux esophagitis on the biopsies.  He is currently taking a proton pump inhibitor with symptoms.  The patient has tried some lifestyle changes but has not been able to lose any significant weight.      Plan: Will schedule the patient for a 4-hour gastric emptying study.           Will refer the patient to Dr. Tellez for surgical consultation with regards to antireflux surgery.  I discussed with  Stas that further evaluation with esophageal motility and pH study may be needed.           Continue Dexilant once daily and encourage gradual weight loss.      I spent over 50% of the office visit counseling and  answering questions from the patient.

## 2019-04-23 ENCOUNTER — TRANSCRIBE ORDERS (OUTPATIENT)
Dept: ADMINISTRATIVE | Facility: HOSPITAL | Age: 34
End: 2019-04-23

## 2019-04-23 DIAGNOSIS — K21.9 GASTRIC REFLUX: Primary | ICD-10-CM

## 2019-04-29 ENCOUNTER — HOSPITAL ENCOUNTER (OUTPATIENT)
Dept: NUCLEAR MEDICINE | Facility: HOSPITAL | Age: 34
Discharge: HOME | End: 2019-04-29

## 2019-04-29 DIAGNOSIS — K21.00 GASTROESOPHAGEAL REFLUX DISEASE WITH ESOPHAGITIS: ICD-10-CM

## 2019-04-29 PROCEDURE — 78264 GASTRIC EMPTYING IMG STUDY: CPT

## 2019-04-29 PROCEDURE — 0 TECHNETIUM SULFUR COLLOID: Performed by: INTERNAL MEDICINE

## 2019-04-29 PROCEDURE — A9541 TC99M SULFUR COLLOID: HCPCS | Performed by: INTERNAL MEDICINE

## 2019-04-29 RX ADMIN — TECHNETIUM TC 99M SULFUR COLLOID 1 DOSE: KIT at 11:10

## 2019-05-03 ENCOUNTER — TELEPHONE (OUTPATIENT)
Dept: GASTROENTEROLOGY | Facility: CLINIC | Age: 34
End: 2019-05-03

## 2019-05-03 NOTE — TELEPHONE ENCOUNTER
----- Message from Rony Flores MD sent at 4/29/2019  5:31 PM EDT -----  Let Mr. Mcclelland know that the gastric emptying study was normal.  Thank you,  RIGO

## 2019-05-03 NOTE — TELEPHONE ENCOUNTER
CALLED PATIENT TO GIVE GES RESULTS. NO ANSWER; LEFT VOICE MESSAGE. I WILL ALSO MAIL RESULT LETTER.

## 2019-05-10 DIAGNOSIS — K21.00 GASTROESOPHAGEAL REFLUX DISEASE WITH ESOPHAGITIS: ICD-10-CM

## 2019-05-10 RX ORDER — DEXLANSOPRAZOLE 60 MG/1
CAPSULE, DELAYED RELEASE ORAL
Qty: 30 CAPSULE | Refills: 1 | Status: SHIPPED | OUTPATIENT
Start: 2019-05-10

## 2019-05-16 ENCOUNTER — HOSPITAL ENCOUNTER (OUTPATIENT)
Dept: GENERAL RADIOLOGY | Facility: HOSPITAL | Age: 34
End: 2019-05-16

## 2019-05-22 ENCOUNTER — HOSPITAL ENCOUNTER (OUTPATIENT)
Dept: GENERAL RADIOLOGY | Facility: HOSPITAL | Age: 34
Discharge: HOME | End: 2019-05-22
Admitting: SURGERY

## 2019-05-22 DIAGNOSIS — K21.9 GASTRIC REFLUX: ICD-10-CM

## 2019-05-22 PROCEDURE — 74220 X-RAY XM ESOPHAGUS 1CNTRST: CPT

## 2019-05-22 RX ADMIN — BARIUM SULFATE 150 ML: 960 POWDER, FOR SUSPENSION ORAL at 09:16

## 2019-05-29 ENCOUNTER — APPOINTMENT (OUTPATIENT)
Dept: GENERAL RADIOLOGY | Facility: HOSPITAL | Age: 34
End: 2019-05-29

## 2023-09-21 ENCOUNTER — OFFICE VISIT (OUTPATIENT)
Dept: PULMONOLOGY | Facility: CLINIC | Age: 38
End: 2023-09-21
Payer: COMMERCIAL

## 2023-09-21 VITALS
HEIGHT: 71 IN | HEART RATE: 67 BPM | WEIGHT: 275.2 LBS | RESPIRATION RATE: 18 BRPM | BODY MASS INDEX: 38.53 KG/M2 | DIASTOLIC BLOOD PRESSURE: 82 MMHG | SYSTOLIC BLOOD PRESSURE: 128 MMHG | OXYGEN SATURATION: 97 %

## 2023-09-21 DIAGNOSIS — R06.83 SNORING: ICD-10-CM

## 2023-09-21 DIAGNOSIS — G47.19 EXCESSIVE DAYTIME SLEEPINESS: ICD-10-CM

## 2023-09-21 DIAGNOSIS — G47.33 OBSTRUCTIVE SLEEP APNEA: Primary | ICD-10-CM

## 2023-09-21 DIAGNOSIS — E66.9 OBESITY (BMI 30-39.9): ICD-10-CM

## 2023-09-21 RX ORDER — PANTOPRAZOLE SODIUM 40 MG/1
40 TABLET, DELAYED RELEASE ORAL DAILY
COMMUNITY

## 2023-09-21 RX ORDER — LISINOPRIL 20 MG/1
20 TABLET ORAL DAILY
COMMUNITY

## 2023-09-21 RX ORDER — ALLOPURINOL 300 MG/1
300 TABLET ORAL DAILY
COMMUNITY

## 2023-09-21 NOTE — PROGRESS NOTES
"  CONSULT NOTE    Requested by:   Merry Hooker MD Eidson, Kasey M, MD      Chief Complaint   Patient presents with    Sleeping Problem    Consult       Subjective:  Jayden Mcclelland is a 38 y.o. male.   Patient came in today for evaluation of possible sleep apnea. Patient says that for the past few years he snores and has woken up in the middle of the night gasping for breath.     he feels that he doesn't get restful night sleep and his quality has diminished considerably. he does feel sleepy watching TV and  reading a book.      he is complaining of occasional headaches.     There is no known family history of sleep apnea    his Epsworth Sleepiness score was 5 /24.     Patient suffers from hypertension.      he drinks 1-2 cups/cans of caffeinated drinks per day.      The following portions of the patient's history were reviewed and updated as appropriate: allergies, current medications, past family history, past medical history, past social history, and past surgical history.    Review of Systems   HENT:  Negative for sinus pressure, sneezing and sore throat.    Respiratory:  Negative for cough, chest tightness, shortness of breath and wheezing.    Cardiovascular:  Negative for palpitations and leg swelling.   Psychiatric/Behavioral:  Positive for sleep disturbance.    All other systems reviewed and are negative.    Past Medical History:   Diagnosis Date    GERD (gastroesophageal reflux disease)     Hyperlipidemia     Hypertension        Social History     Tobacco Use    Smoking status: Never    Smokeless tobacco: Never   Substance Use Topics    Alcohol use: Yes     Alcohol/week: 1.0 standard drink     Types: 1 Glasses of wine, 1 Cans of beer per week     Comment: per month         Objective:  Visit Vitals  /82   Pulse 67   Resp 18   Ht 180.3 cm (71\") Comment: pt reported   Wt 125 kg (275 lb 3.2 oz)   SpO2 97%   BMI 38.38 kg/m²       Physical Exam  Vitals reviewed.   Constitutional:       Appearance: " He is well-developed.   HENT:      Head: Atraumatic.      Mouth/Throat:      Mouth: Mucous membranes are moist.      Comments: Oropharynx was crowded.  Eyes:      Pupils: Pupils are equal, round, and reactive to light.   Neck:      Thyroid: No thyromegaly.      Vascular: No JVD.      Trachea: No tracheal deviation.   Cardiovascular:      Rate and Rhythm: Normal rate and regular rhythm.   Pulmonary:      Effort: Pulmonary effort is normal. No respiratory distress.      Breath sounds: Normal breath sounds. No wheezing.   Musculoskeletal:      Right lower leg: No edema.      Left lower leg: No edema.      Comments: Gait was normal.   Skin:     General: Skin is warm and dry.   Neurological:      Mental Status: He is alert and oriented to person, place, and time.   Psychiatric:         Mood and Affect: Mood normal.         Behavior: Behavior normal.         Assessment/Plan:  Diagnoses and all orders for this visit:    1. Obstructive sleep apnea (Primary)  -     Home Sleep Study; Future    2. Excessive daytime sleepiness  -     Home Sleep Study; Future    3. Snoring  -     Home Sleep Study; Future    4. Obesity (BMI 30-39.9)  -     Home Sleep Study; Future        Return in about 7 months (around 4/9/2024) for Jocelynn/Anjana, ....Also 16 mths w/ Dr. Mercer.    DISCUSSION(if any):  Laboratory workup was also reviewed which showed   Lab Results   Component Value Date    CO2 28.0 07/24/2018   ,  Laboratory workup also showed   Lab Results   Component Value Date    HGB 15.3 03/29/2016   ,   Lab Results   Component Value Date    HCT 44.3 03/29/2016   ,   Lab Results   Component Value Date    TSH 1.090 07/31/2017     Referring physicians office note was also reviewed that did mention daytime sleepiness, fatigue & possible sleep apnea    ===========================  ===========================    Sleep questionnaire was provided to the patient    The pathophysiology of sleep apnea was discussed, with the patient.     We will  encourage him to schedule the sleep study soon.     The patient was made aware of the limitation of the home sleep study, whereby it may underestimate the true AHI and also carries a low sensitivity.  I have informed him that even if the home sleep study is negative, we may suggest an in lab sleep study to completely and definitively rule out/in sleep apnea.  The patient has understood.  This was communicated to the patient, in case home study is to be requested.    The patient is agreeable to try CPAP/BiPAP, if needed.     Patient was educated on good sleep hygiene measures and voiced understanding of the same.     Patient was given reading material regarding sleep apnea    Patient was counseled regarding weight loss.       Dictated utilizing Dragon dictation.    This document was electronically signed by Hilaria Mercer MD on 09/21/23 at 10:32 EDT

## 2023-10-02 ENCOUNTER — PATIENT ROUNDING (BHMG ONLY) (OUTPATIENT)
Dept: PULMONOLOGY | Facility: CLINIC | Age: 38
End: 2023-10-02
Payer: COMMERCIAL

## 2023-11-06 ENCOUNTER — HOSPITAL ENCOUNTER (OUTPATIENT)
Dept: SLEEP MEDICINE | Facility: HOSPITAL | Age: 38
Discharge: HOME | End: 2023-11-06
Admitting: INTERNAL MEDICINE
Payer: COMMERCIAL

## 2023-11-06 DIAGNOSIS — G47.33 OBSTRUCTIVE SLEEP APNEA: ICD-10-CM

## 2023-11-06 DIAGNOSIS — E66.9 OBESITY (BMI 30-39.9): ICD-10-CM

## 2023-11-06 DIAGNOSIS — R06.83 SNORING: ICD-10-CM

## 2023-11-06 DIAGNOSIS — G47.19 EXCESSIVE DAYTIME SLEEPINESS: ICD-10-CM

## 2023-11-06 PROCEDURE — 95806 SLEEP STUDY UNATT&RESP EFFT: CPT

## 2023-11-09 DIAGNOSIS — G47.33 OSA (OBSTRUCTIVE SLEEP APNEA): Primary | ICD-10-CM

## 2024-04-25 ENCOUNTER — OFFICE VISIT (OUTPATIENT)
Dept: PULMONOLOGY | Facility: CLINIC | Age: 39
End: 2024-04-25
Payer: COMMERCIAL

## 2024-04-25 VITALS
OXYGEN SATURATION: 98 % | HEART RATE: 99 BPM | RESPIRATION RATE: 16 BRPM | WEIGHT: 290 LBS | SYSTOLIC BLOOD PRESSURE: 128 MMHG | HEIGHT: 71 IN | BODY MASS INDEX: 40.6 KG/M2 | DIASTOLIC BLOOD PRESSURE: 78 MMHG

## 2024-04-25 DIAGNOSIS — G47.33 OSA (OBSTRUCTIVE SLEEP APNEA): Primary | ICD-10-CM

## 2024-04-25 DIAGNOSIS — G47.19 EXCESSIVE DAYTIME SLEEPINESS: ICD-10-CM

## 2024-04-25 DIAGNOSIS — E66.01 MORBID OBESITY WITH BMI OF 40.0-44.9, ADULT: ICD-10-CM

## 2024-04-25 PROCEDURE — 99212 OFFICE O/P EST SF 10 MIN: CPT | Performed by: NURSE PRACTITIONER

## 2024-04-25 RX ORDER — ERGOCALCIFEROL 1.25 MG/1
50000 CAPSULE ORAL WEEKLY
COMMUNITY
Start: 2024-02-27

## 2024-04-25 NOTE — PROGRESS NOTES
"Chief Complaint   Patient presents with    Sleeping Problem    Follow-up         Subjective   Jayden Mcclelland is a 39 y.o. male.     The patient comes in today for follow-up of obstructive sleep apnea.    I reviewed his sleep study and discussed the results with him today.  The sleep study revealed moderate sleep apnea with an apnea hypopnea index of 18.5 per hour.  His apnea-hypopnea index was worse in supine position with AHI 60 per hour.     He has been set up with AutoPAP at a pressure of 6/14.  He feels more rested with the machine. He can definitely tell when he does not use the machine.     Bedtime 10-11 pm. OOB 8 am. He falls asleep fairly quickly.     Not snoring with machine.       The following portions of the patient's history were reviewed and updated as appropriate: allergies, current medications, past family history, past medical history, past social history, and past surgical history.      Review of Systems   HENT:  Negative for sinus pressure, sneezing and sore throat.    Respiratory:  Negative for cough, chest tightness, shortness of breath and wheezing.        Objective   Visit Vitals  /78   Pulse 99   Resp 16   Ht 180.3 cm (71\") Comment: pt reported   Wt 132 kg (290 lb)   SpO2 98%   BMI 40.45 kg/m²       Physical Exam  Vitals reviewed.   Constitutional:       Appearance: He is well-developed.   HENT:      Head: Atraumatic.      Mouth/Throat:      Mouth: Mucous membranes are moist.      Comments: Crowded oropharynx.   Eyes:      Extraocular Movements: Extraocular movements intact.   Cardiovascular:      Rate and Rhythm: Normal rate and regular rhythm.   Abdominal:      Comments: Obese abdomen.    Musculoskeletal:      Comments: Gait normal.    Skin:     General: Skin is warm.   Neurological:      Mental Status: He is alert and oriented to person, place, and time.           Assessment & Plan   Diagnoses and all orders for this visit:    1. DELPHINE (obstructive sleep apnea) (Primary)    2. " Morbid obesity with BMI of 40.0-44.9, adult    3. Excessive daytime sleepiness           Return in about 5 months (around 9/25/2024) for Recheck, For Dr. Mercer, Sleep ONLY.    DISCUSSION (if any):  Continue treatment with AutoPAP at a pressure of 6/14, with a full-face mask.    He is still having some daytime sleepiness so will see if this has improved at next visit.     Patient's compliance data was reviewed and the compliance is greater than 90%.    Humidification setup, hose and mask care discussed.    Weight loss advised.    Use every night for at least 4 hours stressed.       Dictated utilizing Dragon dictation.    This document was electronically signed by YOUNG Nathan April 25, 2024  14:44 EDT